# Patient Record
Sex: MALE | Race: WHITE | NOT HISPANIC OR LATINO | Employment: FULL TIME | URBAN - METROPOLITAN AREA
[De-identification: names, ages, dates, MRNs, and addresses within clinical notes are randomized per-mention and may not be internally consistent; named-entity substitution may affect disease eponyms.]

---

## 2019-02-17 ENCOUNTER — HOSPITAL ENCOUNTER (EMERGENCY)
Facility: MEDICAL CENTER | Age: 59
End: 2019-02-17
Attending: EMERGENCY MEDICINE
Payer: COMMERCIAL

## 2019-02-17 VITALS
HEIGHT: 74 IN | OXYGEN SATURATION: 90 % | SYSTOLIC BLOOD PRESSURE: 119 MMHG | RESPIRATION RATE: 16 BRPM | DIASTOLIC BLOOD PRESSURE: 87 MMHG | WEIGHT: 242.51 LBS | HEART RATE: 109 BPM | BODY MASS INDEX: 31.12 KG/M2

## 2019-02-17 VITALS
OXYGEN SATURATION: 95 % | SYSTOLIC BLOOD PRESSURE: 152 MMHG | TEMPERATURE: 96.8 F | DIASTOLIC BLOOD PRESSURE: 88 MMHG | HEART RATE: 118 BPM | WEIGHT: 266.76 LBS | RESPIRATION RATE: 18 BRPM | BODY MASS INDEX: 34.25 KG/M2

## 2019-02-17 DIAGNOSIS — R04.0 EPISTAXIS: ICD-10-CM

## 2019-02-17 PROCEDURE — A9270 NON-COVERED ITEM OR SERVICE: HCPCS | Performed by: EMERGENCY MEDICINE

## 2019-02-17 PROCEDURE — 99283 EMERGENCY DEPT VISIT LOW MDM: CPT

## 2019-02-17 PROCEDURE — 303620 HCHG EPISTAXIS CONTROL

## 2019-02-17 PROCEDURE — 99284 EMERGENCY DEPT VISIT MOD MDM: CPT

## 2019-02-17 PROCEDURE — 700102 HCHG RX REV CODE 250 W/ 637 OVERRIDE(OP): Performed by: EMERGENCY MEDICINE

## 2019-02-17 RX ORDER — ECHINACEA PURPUREA EXTRACT 125 MG
2 TABLET ORAL
Status: DISCONTINUED | OUTPATIENT
Start: 2019-02-17 | End: 2019-02-17 | Stop reason: HOSPADM

## 2019-02-17 RX ORDER — OXYMETAZOLINE HYDROCHLORIDE 0.05 G/100ML
2 SPRAY NASAL ONCE
Status: COMPLETED | OUTPATIENT
Start: 2019-02-17 | End: 2019-02-17

## 2019-02-17 RX ADMIN — OXYMETAZOLINE HYDROCHLORIDE 2 SPRAY: 5 SPRAY NASAL at 03:00

## 2019-02-17 RX ADMIN — SALINE NASAL SPRAY 2 SPRAY: 1.5 SOLUTION NASAL at 03:25

## 2019-02-17 ASSESSMENT — LIFESTYLE VARIABLES: DO YOU DRINK ALCOHOL: NO

## 2019-02-17 NOTE — DISCHARGE INSTRUCTIONS
Use afrin and nasal clamp for recurrent bleeding. (you should take both of these on the plane with you when you fly home)

## 2019-02-17 NOTE — ED PROVIDER NOTES
ED Provider Note    Scribed for Mich Greenberg M.D. by Paul Claudio. 2/17/2019  7:30 AM    Primary care provider: Earl Matias M.D.  Means of arrival: Walk in   History obtained from: Patient   History limited by: None     CHIEF COMPLAINT  Chief Complaint   Patient presents with   • Epistaxis       HPI  Cheng Hernández is a 58 y.o. male who presents to the Emergency Department for epistaxis. The patient was seen here earlier this morning and discharged home at 0300 after hemostasis achieved with Afrin Spray. He reports that the bleeding started again about one hour prior to arrival and he has been unable to get it to stop. He reports taking a daily aspirin however is not on any other blood thinners. Denies headache, nausea, vomiting, dizziness, trauma.     REVIEW OF SYSTEMS  Pertinent positives include epistaxis.   Pertinent negatives include no headache, nausea, vomiting, dizziness, trauma.    All other systems reviewed and negative. See HPI for further details.     PAST MEDICAL HISTORY   Takes baby aspirin. No pertinent medical history. No diabetes or hypertension.    SURGICAL HISTORY  None pertinent     SOCIAL HISTORY  Social History   Substance Use Topics   • Smoking status: Never Smoker   • Smokeless tobacco: Never Used   • Alcohol use No      History   Drug Use No       FAMILY HISTORY  History reviewed. No pertinent family history.    CURRENT MEDICATIONS  No current facility-administered medications on file prior to encounter.      No current outpatient prescriptions on file prior to encounter.   Daily aspirin per patient.     ALLERGIES  No Known Allergies    PHYSICAL EXAM  VITAL SIGNS: /95   Pulse (!) 128   Temp 36 °C (96.8 °F) (Temporal)   Resp 16   Wt 121 kg (266 lb 12.1 oz)   SpO2 95%   BMI 34.25 kg/m²     Nursing note and vitals reviewed.  Constitutional: Well-developed and well-nourished. No distress.   HENT: Head is normocephalic and atraumatic. Oropharynx is clear and moist without  exudate or erythema. Active bleeding from the anterior nasal septum of the right nares.   Eyes: Pupils are equal, round, and reactive to light. Conjunctiva are normal.   Cardiovascular: Normal rate and regular rhythm.  Pulmonary/Chest: No respiratory distress.   Musculoskeletal: Extremities exhibit normal range of motion without edema or tenderness.   Neurological: Awake, alert and oriented to person, place, and time. No focal deficits noted.  Skin: Skin is warm and dry. No rash.   Psychiatric: Normal mood and affect. Appropriate for clinical situation.    DIAGNOSTIC STUDIES / PROCEDURES    Epistaxis Procedure    Indication: Bleeding right nare    Pre-medication: Afrin nasal spray    Procedure: The patient was positioned appropriately and the nares were cleared as well as possible. Used Epinephrine soaked cotton ball in right nare. Hemostasis not achieved. Cetacaine spray used. The bleeding site was localized to the right nare in the septal region and cauterized with silver nitrate.  Hemostasis was obtained.    The patient tolerated the procedure well.    Complications: None      COURSE & MEDICAL DECISION MAKING  Nursing notes, VS, PMSFHx reviewed in chart.     Review of past medical records shows the patient was seen here earlier today with same symptoms.      7:30 AM - Patient seen and examined at bedside. Afrin nasal spray and epinephrine used in attempt to achieve homeostasis.     7:45 AM - Hemostasis not achieved and so cauterized right nares as noted in procedure note.     8:20 AM - Hemostasis obtained. Patient tolerating PO fluids.     8:29 AM - Patient agreeable with discharge home at this time and encouraged to return with any new symptoms.     The patient will return for new or worsening symptoms and is stable at the time of discharge.    DISPOSITION:  Patient will be discharged home in stable condition.    FOLLOW UP:  [facility], Emergency Dept  [address]  [id number]  [phone]        your doctor    Schedule an appointment as soon as possible for a visit         OUTPATIENT MEDICATIONS:  New Prescriptions    No medications on file       FINAL IMPRESSION  1. Epistaxis          IPaul (Scribe), am scribing for, and in the presence of, Mich Greenberg M.D..    Electronically signed by: Paul Claudio (Scribe), 2/17/2019    IMich M.D. personally performed the services described in this documentation, as scribed by Paul Claudio in my presence, and it is both accurate and complete. E.     The note accurately reflects work and decisions made by me.  Mich Greenberg  2/17/2019  11:31 AM

## 2019-02-17 NOTE — ED PROVIDER NOTES
"ED Provider Note    Scribed for Greg Cuadra M.D. by Vicente Dugan. 2/17/2019  2:41 AM    Means of arrival: Ambulance  History obtained from: Patient  History limited by: None    CHIEF COMPLAINT  Chief Complaint   Patient presents with   • Epistaxis     started 30 minutes ago       HPI    Cheng Hernández is a 58 y.o. male with history of hypertension presenting with an epistaxis localized to bilateral nares onset around 2 AM tonight while brushing his teeth. He was prompted to come to the ED as epistaxis has continued to persist. The patient states prior to episode [place] but reports no heavy alcohol use. He denies associated syncope, shortness of breath, or hematochezia. The patient has no history of any concerning medical conditions. He currently takes Aspirin daily, but no other blood thinning medications. The patient is visiting from [place] [facility].    REVIEW OF SYSTEMS  See [facility] for further details.   Pertinent positives include: epistaxis.  Pertinent negative include: syncope, shortness of breath, or hematochezia.  10 + review of systems otherwise negative     PAST MEDICAL HISTORY   Hypertension    SOCIAL HISTORY  Social History     Social History Main Topics   • Alcohol use Yes, socially       SURGICAL HISTORY  patient denies any surgical history    CURRENT MEDICATIONS  Aspirin daily    ALLERGIES  No Known Allergies    PHYSICAL EXAM  VITAL SIGNS: /87   Pulse (!) 116   Resp 16   Ht 1.88 m (6' 2\")   Wt 110 kg (242 lb 8.1 oz)   SpO2 91%   BMI 31.14 kg/m²    SpO2: I interpret this pulse oximetry as normal  Constitutional: Well developed, Well nourished, No distress, Non-toxic appearance.   HENT: Normocephalic, Atraumatic. No obvious bleeding in oropharynx.   Eyes: PERRL, EOMI, Conjunctiva normal, No discharge.   Neck: no anterior cervical lymphadenopathy  CV: Good pulses  Thorax & Lungs: No respiratory distress.   Skin: Warm, Dry, No erythema, No rash.  " "  Musculoskeletal: No major deformities noted.   Neurologic: Awake, alert. Moves all extremities spontaneously.  Psychiatric: Affect normal, Mood normal.     PROCEDURES    Epistaxis Procedure Note    Indication: Bleeding    Pre-medication: none    Procedure: The patient was positioned appropriately and the nares were cleared as well as possible. The bleeding site was localized to the right nare. Afrin spray was applied and nasal clamp was placed.  Hemostasis was obtained.    The patient tolerated the procedure well.    Complications: None      COURSE & MEDICAL DECISION MAKING  Pertinent Labs & Imaging studies reviewed. (See chart for details)    Differential diagnoses include but not limited to: Anterior epistaxis, posterior epistaxis, septal perforation, mass    2:41 AM - Patient seen and examined at bedside. Discussed plan of care, including Afrin medication. Patient agrees to the plan of care.     2:49 AM - Afrin spray administered at this time, see epistaxis procedure above.     3:12 AM - Patient was reevaluated at bedside. Hemostasis has been achieved and patient reports to be feeling better. He is informed he will be discharged home. The patient is instructed to return to ED for worsening bleeding or any other concerning symptoms. He is understanding and agreeable to discharge.     Vitals:    02/17/19 0224 02/17/19 0226 02/17/19 0300 02/17/19 0312   BP:  119/87     Pulse:  (!) 116 (!) 112 (!) 109   Resp:  16     TempSrc:  Tympanic     SpO2:  91% 90% 90%   Weight: 110 kg (242 lb 8.1 oz)      Height: 1.88 m (6' 2\")          Medications   oxymetazoline (AFRIN) 0.05 % nasal spray 2 Spray (2 Sprays Nasal Given 2/17/19 0300)       58-year-old male presenting for prolonged nosebleed.  Started this evening, not relieved with simple pressure.  Not on anticoagulation.  Hypertension history, otherwise no remarkable history.  Vital signs and exam are reassuring, slightly tachycardic.  Has been drinking alcohol this " evening however it does not appear significantly intoxicated.  Able to control bleeding with Afrin and simple pressure.  Observed afterwards, no repeat bleeding.  Is leaving town this morning, here visiting on vacation.  Patient remains hemodynamically stable without any continued bleeding or signs of hemodynamic instability.  No stigmata for cirrhosis or other coagulopathy.  Safe for discharge.  Patient or guardian given strict returns precautions and care instructions.  Advised PCP follow-up in 1-2 days.  Patient or guardian expresses understanding and agrees to plan.    The patient will return for new or worsening symptoms and is stable at the time of discharge.    DISPOSITION:  Patient will be discharged home in stable condition.    FOLLOW UP:  Your primary care doctor    Schedule an appointment as soon as possible for a visit in 2 days        OUTPATIENT MEDICATIONS:  There are no discharge medications for this patient.      FINAL IMPRESSION  Visit Diagnoses     ICD-10-CM   1. Epistaxis R04.0        Vicente VILLARREAL (Angie), am scribing for, and in the presence of, Greg Cuadra M.D..    Electronically signed by: Vicente Murrieta), 2/17/2019    Greg VILLARREAL M.D. personally performed the services described in this documentation, as scribed by Vicente Dugan in my presence, and it is both accurate and complete.    E.    The note accurately reflects work and decisions made by me.  Greg Cuadra  2/17/2019  5:57 AM

## 2019-02-17 NOTE — ED TRIAGE NOTES
Pt reports his nose started bleeding approx 30 minutes ago, no trauma. Does take daily 325mg Aspirin, history of HTN and hyperlipidemia.

## 2019-02-17 NOTE — ED NOTES
Pt demonstrated safe ambulation upon discharge. Directed to lobby where pt and wife will call for taxi. VSS. Pt left with two nasal sprays, given discharge paperwork. Pt verbalized understanding of discharge instructions.

## 2019-02-17 NOTE — ED TRIAGE NOTES
Chief Complaint   Patient presents with   • Epistaxis   Pt reports epistaxis since 0100 this morning.  Pt seen here for the same and just discharged.  Pt reports bleeding started again x 1 hr ago.  Pt takes ASA daily.  Pt educated on triage process and instructed to notify triage RN of any change in status.

## 2019-02-17 NOTE — ED NOTES
Per erp pt may drink water. Bleeding remains controlled.  Water provided to pt. Denies any new complaints.